# Patient Record
Sex: MALE | Race: BLACK OR AFRICAN AMERICAN | Employment: OTHER | URBAN - METROPOLITAN AREA
[De-identification: names, ages, dates, MRNs, and addresses within clinical notes are randomized per-mention and may not be internally consistent; named-entity substitution may affect disease eponyms.]

---

## 2019-04-10 ENCOUNTER — OFFICE VISIT (OUTPATIENT)
Dept: URGENT CARE | Facility: URGENT CARE | Age: 39
End: 2019-04-10

## 2019-04-10 VITALS
SYSTOLIC BLOOD PRESSURE: 150 MMHG | OXYGEN SATURATION: 98 % | RESPIRATION RATE: 18 BRPM | WEIGHT: 210 LBS | HEIGHT: 70 IN | HEART RATE: 63 BPM | TEMPERATURE: 98.5 F | BODY MASS INDEX: 30.06 KG/M2 | DIASTOLIC BLOOD PRESSURE: 84 MMHG

## 2019-04-10 DIAGNOSIS — M54.50 ACUTE BILATERAL LOW BACK PAIN WITHOUT SCIATICA: Primary | ICD-10-CM

## 2019-04-10 PROCEDURE — 96372 THER/PROPH/DIAG INJ SC/IM: CPT | Performed by: NURSE PRACTITIONER

## 2019-04-10 PROCEDURE — 99203 OFFICE O/P NEW LOW 30 MIN: CPT | Mod: 25 | Performed by: NURSE PRACTITIONER

## 2019-04-10 RX ORDER — KETOROLAC TROMETHAMINE 30 MG/ML
30 INJECTION, SOLUTION INTRAMUSCULAR; INTRAVENOUS ONCE
Status: COMPLETED | OUTPATIENT
Start: 2019-04-10 | End: 2019-04-10

## 2019-04-10 RX ORDER — CYCLOBENZAPRINE HCL 10 MG
5-10 TABLET ORAL 3 TIMES DAILY PRN
Qty: 30 TABLET | Refills: 0 | Status: SHIPPED | OUTPATIENT
Start: 2019-04-10 | End: 2019-05-24

## 2019-04-10 RX ORDER — KETOROLAC TROMETHAMINE 10 MG/1
10 TABLET, FILM COATED ORAL EVERY 6 HOURS PRN
Qty: 20 TABLET | Refills: 0 | Status: SHIPPED | OUTPATIENT
Start: 2019-04-10 | End: 2019-05-24

## 2019-04-10 RX ORDER — KETOROLAC TROMETHAMINE 30 MG/ML
60 INJECTION, SOLUTION INTRAMUSCULAR; INTRAVENOUS ONCE
Status: DISCONTINUED | OUTPATIENT
Start: 2019-04-10 | End: 2019-04-10 | Stop reason: CLARIF

## 2019-04-10 RX ADMIN — KETOROLAC TROMETHAMINE 30 MG: 30 INJECTION, SOLUTION INTRAMUSCULAR; INTRAVENOUS at 14:27

## 2019-04-10 ASSESSMENT — ENCOUNTER SYMPTOMS
SORE THROAT: 0
DIAPHORESIS: 0
NAUSEA: 0
SHORTNESS OF BREATH: 0
RHINORRHEA: 0
FEVER: 0
BACK PAIN: 1
VOMITING: 0
CHILLS: 0
DIARRHEA: 0
COUGH: 0

## 2019-04-10 ASSESSMENT — PAIN SCALES - GENERAL: PAINLEVEL: EXTREME PAIN (9)

## 2019-04-10 ASSESSMENT — MIFFLIN-ST. JEOR: SCORE: 1881.93

## 2019-04-10 NOTE — NURSING NOTE
The following medication was given:     MEDICATION: TORADOL   ROUTE: IM  SITE: Deltoid - Right  DOSE: 30 mg per 1 ML  LOT #: [id number]  :  [email or web address]  EXPIRATION DATE:  10/20  NDC#: 47948-366-85    Armin Delgado

## 2019-04-10 NOTE — PATIENT INSTRUCTIONS
Patient Education     Back Care Tips    Caring for your back  These are things you can do to prevent a recurrence of acute back pain and to reduce symptoms from chronic back pain:    Maintain a healthy weight. If you are overweight, losing weight will help most types of back pain.    Exercise is an important part of recovery from most types of back pain. The muscles behind and in front of the spine support the back. This means strengthening both the back muscles and the abdominal muscles will provide better support for your spine.     Swimming and brisk walking are good overall exercises to improve your fitness level.    Practice safe lifting methods (below).    Practice good posture when sitting, standing and walking. Avoid prolonged sitting. This puts more stress on the lower back than standing or walking.    Wear quality shoes with sufficient arch support. Foot and ankle alignment can affect back symptoms. Women should avoid wearing high heels.    Therapeutic massage can help relax the back muscles without stretching them.    During the first 24 to 72 hours after an acute injury or flare-up of chronic back pain, apply an ice pack to the painful area for 20 minutes and then remove it for 20 minutes, over a period of 60 to 90 minutes, or several times a day. As a safety precaution, do not use a heating pad at bedtime. Sleeping on a heating pad can lead to skin burns or tissue damage.    You can alternate ice and heat therapies.  Medicines  Talk to your healthcare provider before using medicines, especially if you have other medical problems or are taking other medicines.    You may use acetaminophen or ibuprofen to control pain, unless your healthcare provider prescribed other pain medicine. If you have chronic conditions like diabetes, liver or kidney disease, stomach ulcers, or gastrointestinal bleeding, or are taking blood thinners, talk with your healthcare provider before taking any medicines.    Be careful  if you are given prescription pain medicines, narcotics, or medicine for muscle spasm. They can cause drowsiness, affect your coordination, reflexes, and judgment. Do not drive or operate heavy machinery while taking these types of medicines. Take prescription pain medicine only as prescribed by your healthcare provider.  Lumbar stretch  Here is a simple stretching exercise that will help relax muscle spasm and keep your back more limber. If exercise makes your back pain worse, don t do it.    Lie on your back with your knees bent and both feet on the ground.    Slowly raise your left knee to your chest as you flatten your lower back against the floor. Hold for 5 seconds.    Relax and repeat the exercise with your right knee.    Do 10 of these exercises for each leg.  Safe lifting method    Don t bend over at the waist to lift an object off the floor.  Instead, bend your knees and hips in a squat.     Keep your back and head upright    Hold the object close to your body, directly in front of you.    Straighten your legs to lift the object.     Lower the object to the floor in the reverse fashion.    If you must slide something across the floor, push it.  Posture tips  Sitting  Sit in chairs with straight backs or low-back support. Keep your knees lower than your hips, with your feet flat on the floor.  When driving, sit up straight. Adjust the seat forward so you are not leaning toward the steering wheel.  A small pillow or rolled towel behind your lower back may help if you are driving long distances.   Standing  When standing for long periods, shift most of your weight to one leg at a time. Alternate legs every few minutes.   Sleeping  The best way to sleep is on your side with your knees bent. Put a low pillow under your head to support your neck in a neutral spine position. Avoid thick pillows that bend your neck to one side. Put a pillow between your legs to further relax your lower back. If you sleep on your  back, put pillows under your knees to support your legs in a slightly flexed position. Use a firm mattress. If your mattress sags, replace it, or use a 1/2-inch plywood board under the mattress to add support.  Follow-up care  Follow up with your healthcare provider, or as advised.  If X-rays, a CT scan or an MRI scan were taken, they will be reviewed by a radiologist. You will be notified of any new findings that may affect your care.  Call 911  Call 911 if any of the following occur:    Trouble breathing    Confusion    Very drowsy    Fainting or loss of consciousness    Rapid or very slow heart rate    Loss of  bowel or bladder control  When to seek medical advice  Call your healthcare provider right away if any of the following occur:    Pain becomes worse or spreads to your arms or legs    Weakness or numbness in one or both arms or legs    Numbness in the groin area  Date Last Reviewed: 6/1/2016 2000-2018 [employer]. [address] [place] [address], [place] [address]. All rights reserved. This information is not intended as a substitute for professional medical care. Always follow your healthcare professional's instructions.

## 2019-04-10 NOTE — PROGRESS NOTES
"SUBJECTIVE:   Jesus Carl is a 38 year old male presenting with a chief complaint of   Chief Complaint   Patient presents with     Back Pain     for about 3 months        He is a new patient of [facility].    Back Pain    Onset of symptoms was 3 month(s) ago.  Location: bilateral low back  Radiation: radiates into the right buttocks and radiates into the left buttocks  Context: Not injury related, report heavy lifting befre        Course of symptoms is worsening.    Severity moderate  Current and Associated symptoms: pain and stiffness  Denies: fecal incontinence, urinary incontinence, lower extremity numbness, lower extremity weakness and paresthesia    Aggravating Factors: walking, sitting and changing position  Therapies to improve symptoms include: ibuprofen and Tylenol  Past history: no prior back problems      Review of Systems   Constitutional: Negative for chills, diaphoresis and fever.   HENT: Negative for congestion, ear pain, rhinorrhea and sore throat.    Respiratory: Negative for cough and shortness of breath.    Gastrointestinal: Negative for diarrhea, nausea and vomiting.   Musculoskeletal: Positive for back pain.   All other systems reviewed and are negative.      No past medical history on file.  History reviewed. No pertinent family history.  Current Outpatient Medications   Medication Sig Dispense Refill     cyclobenzaprine (FLEXERIL) 10 MG tablet Take 0.5-1 tablets (5-10 mg) by mouth 3 times daily as needed for muscle spasms 30 tablet 0     ketorolac (TORADOL) 10 MG tablet Take 1 tablet (10 mg) by mouth every 6 hours as needed for moderate pain 20 tablet 0     Social History     Tobacco Use     Smoking status: Never Smoker     Smokeless tobacco: Never Used   Substance Use Topics     Alcohol use: Yes       OBJECTIVE  /84 (BP Location: Left arm, Patient Position: Sitting, Cuff Size: Adult Large)   Pulse 63   Temp 98.5  F (36.9  C) (Oral)   Resp 18   Ht 1.775 m (5' 9.88\")   Wt 95.3 " kg (210 lb)   SpO2 98%   BMI 30.23 kg/m      Physical Exam   Constitutional: No distress.   HENT:   Right Ear: Tympanic membrane normal.   Left Ear: Tympanic membrane normal.   Eyes: Pupils are equal, round, and reactive to light. EOM are normal.   Neck: Normal range of motion. Neck supple.   Cardiovascular: Normal rate and normal heart sounds.   Pulmonary/Chest: Effort normal and breath sounds normal. No respiratory distress.   Abdominal: Soft. Bowel sounds are normal.   Musculoskeletal:   Lumbosacral spine area reveals no focal tenderness or mass.  Normal lumbosacral range of motion. Straight leg raise is negative.  Neuro: DTR's, motor strength and sensation normal.   Lymphadenopathy:     He has no cervical adenopathy.   Neurological: He is alert. No cranial nerve deficit.   Skin: Skin is warm and dry. He is not diaphoretic.   Psychiatric: He has a normal mood and affect.   Nursing note and vitals reviewed.        ASSESSMENT:      ICD-10-CM    1. Acute bilateral low back pain without sciatica M54.5 ketorolac (TORADOL) 10 MG tablet     cyclobenzaprine (FLEXERIL) 10 MG tablet     ketorolac (TORADOL) injection 30 mg     DISCONTINUED: ketorolac (TORADOL) injection 60 mg      Differential Diagnosis:  Back Pain: myofascial low back strain, lumbosacral strain, possible herniated disc  and compression fracture    Serious Comorbid Conditions:  Adult:  None    PLAN:  Rest the affected painful area as much as possible.  Apply ice for 15-20 minutes intermittently as needed and especially after any offending activity. Daily stretching.  As pain recedes, begin normal activities slowly as tolerated.  Consider Physical Therapy if symptoms not better with symptomatic care.        Patient Instructions     Patient Education     Back Care Tips    Caring for your back  These are things you can do to prevent a recurrence of acute back pain and to reduce symptoms from chronic back pain:    Maintain a healthy weight. If you are  overweight, losing weight will help most types of back pain.    Exercise is an important part of recovery from most types of back pain. The muscles behind and in front of the spine support the back. This means strengthening both the back muscles and the abdominal muscles will provide better support for your spine.     Swimming and brisk walking are good overall exercises to improve your fitness level.    Practice safe lifting methods (below).    Practice good posture when sitting, standing and walking. Avoid prolonged sitting. This puts more stress on the lower back than standing or walking.    Wear quality shoes with sufficient arch support. Foot and ankle alignment can affect back symptoms. Women should avoid wearing high heels.    Therapeutic massage can help relax the back muscles without stretching them.    During the first 24 to 72 hours after an acute injury or flare-up of chronic back pain, apply an ice pack to the painful area for 20 minutes and then remove it for 20 minutes, over a period of 60 to 90 minutes, or several times a day. As a safety precaution, do not use a heating pad at bedtime. Sleeping on a heating pad can lead to skin burns or tissue damage.    You can alternate ice and heat therapies.  Medicines  Talk to your healthcare provider before using medicines, especially if you have other medical problems or are taking other medicines.    You may use acetaminophen or ibuprofen to control pain, unless your healthcare provider prescribed other pain medicine. If you have chronic conditions like diabetes, liver or kidney disease, stomach ulcers, or gastrointestinal bleeding, or are taking blood thinners, talk with your healthcare provider before taking any medicines.    Be careful if you are given prescription pain medicines, narcotics, or medicine for muscle spasm. They can cause drowsiness, affect your coordination, reflexes, and judgment. Do not drive or operate heavy machinery while taking these  types of medicines. Take prescription pain medicine only as prescribed by your healthcare provider.  Lumbar stretch  Here is a simple stretching exercise that will help relax muscle spasm and keep your back more limber. If exercise makes your back pain worse, don t do it.    Lie on your back with your knees bent and both feet on the ground.    Slowly raise your left knee to your chest as you flatten your lower back against the floor. Hold for 5 seconds.    Relax and repeat the exercise with your right knee.    Do 10 of these exercises for each leg.  Safe lifting method    Don t bend over at the waist to lift an object off the floor.  Instead, bend your knees and hips in a squat.     Keep your back and head upright    Hold the object close to your body, directly in front of you.    Straighten your legs to lift the object.     Lower the object to the floor in the reverse fashion.    If you must slide something across the floor, push it.  Posture tips  Sitting  Sit in chairs with straight backs or low-back support. Keep your knees lower than your hips, with your feet flat on the floor.  When driving, sit up straight. Adjust the seat forward so you are not leaning toward the steering wheel.  A small pillow or rolled towel behind your lower back may help if you are driving long distances.   Standing  When standing for long periods, shift most of your weight to one leg at a time. Alternate legs every few minutes.   Sleeping  The best way to sleep is on your side with your knees bent. Put a low pillow under your head to support your neck in a neutral spine position. Avoid thick pillows that bend your neck to one side. Put a pillow between your legs to further relax your lower back. If you sleep on your back, put pillows under your knees to support your legs in a slightly flexed position. Use a firm mattress. If your mattress sags, replace it, or use a 1/2-inch plywood board under the mattress to add support.  Follow-up  care  Follow up with your healthcare provider, or as advised.  If X-rays, a CT scan or an MRI scan were taken, they will be reviewed by a radiologist. You will be notified of any new findings that may affect your care.  Call 911  Call 911 if any of the following occur:    Trouble breathing    Confusion    Very drowsy    Fainting or loss of consciousness    Rapid or very slow heart rate    Loss of  bowel or bladder control  When to seek medical advice  Call your healthcare provider right away if any of the following occur:    Pain becomes worse or spreads to your arms or legs    Weakness or numbness in one or both arms or legs    Numbness in the groin area  Date Last Reviewed: 6/1/2016 2000-2018 [employer]. [address] [place] [address], [place] [address]. All rights reserved. This information is not intended as a substitute for professional medical care. Always follow your healthcare professional's instructions.

## 2019-05-17 ENCOUNTER — OFFICE VISIT (OUTPATIENT)
Dept: URGENT CARE | Facility: URGENT CARE | Age: 39
End: 2019-05-17
Payer: MEDICAID

## 2019-05-17 ENCOUNTER — ANCILLARY PROCEDURE (OUTPATIENT)
Dept: GENERAL RADIOLOGY | Facility: CLINIC | Age: 39
End: 2019-05-17
Attending: PHYSICIAN ASSISTANT
Payer: MEDICAID

## 2019-05-17 VITALS
RESPIRATION RATE: 22 BRPM | DIASTOLIC BLOOD PRESSURE: 87 MMHG | HEART RATE: 78 BPM | BODY MASS INDEX: 30.55 KG/M2 | TEMPERATURE: 98.2 F | SYSTOLIC BLOOD PRESSURE: 132 MMHG | WEIGHT: 212.2 LBS | OXYGEN SATURATION: 99 %

## 2019-05-17 DIAGNOSIS — M54.50 BILATERAL LOW BACK PAIN WITHOUT SCIATICA, UNSPECIFIED CHRONICITY: Primary | ICD-10-CM

## 2019-05-17 DIAGNOSIS — M54.50 BILATERAL LOW BACK PAIN WITHOUT SCIATICA, UNSPECIFIED CHRONICITY: ICD-10-CM

## 2019-05-17 PROCEDURE — 99214 OFFICE O/P EST MOD 30 MIN: CPT | Performed by: PHYSICIAN ASSISTANT

## 2019-05-17 PROCEDURE — 72100 X-RAY EXAM L-S SPINE 2/3 VWS: CPT

## 2019-05-17 RX ORDER — METHYLPREDNISOLONE 4 MG
TABLET, DOSE PACK ORAL
Qty: 21 TABLET | Refills: 0 | Status: SHIPPED | OUTPATIENT
Start: 2019-05-17 | End: 2019-05-24

## 2019-05-17 NOTE — PROGRESS NOTES
"  S: 38-year-old male presents for evaluation of bilateral low back pain now present for about 6 months.  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  He denies any lower extremity radicular pain, numbness, tingling, weakness.  No fever.  No weight loss.  No night sweats.  No chronic cough.  No bowel or bladder trouble.  Was seen in April for it and given Toradol and Flexeril.  He feels it has not significantly helped with his pain.  He denies any specific injury.  He denies any dysuria.  No urinary frequency, urgency or hesitancy. He feels like the muscles in his back \"knot\" up.      No Known Allergies    History reviewed. No pertinent past medical history.      Current Outpatient Medications on File Prior to Visit:  [] cyclobenzaprine (FLEXERIL) 10 MG tablet Take 0.5-1 tablets (5-10 mg) by mouth 3 times daily as needed for muscle spasms   [] ketorolac (TORADOL) 10 MG tablet Take 1 tablet (10 mg) by mouth every 6 hours as needed for moderate pain     No current facility-administered medications on file prior to visit.     History reviewed. No pertinent surgical history.    Social History     Socioeconomic History     Marital status: Single     Spouse name: Not on file     Number of children: Not on file     Years of education: Not on file     Highest education level: Not on file   Occupational History     Not on file   Social Needs     Financial resource strain: Not on file     Food insecurity:     Worry: Not on file     Inability: Not on file     Transportation needs:     Medical: Not on file     Non-medical: Not on file   Tobacco Use     Smoking status: Never Smoker     Smokeless tobacco: Never Used   Substance and Sexual Activity     Alcohol use: Yes     Drug use: Never     Sexual activity: Not on file   Lifestyle     Physical activity:     Days per week: Not on file     Minutes per session: Not on file     Stress: Not on file   Relationships     Social connections:     " Talks on phone: Not on file     Gets together: Not on file     Attends Denominational service: Not on file     Active member of club or organization: Not on file     Attends meetings of clubs or organizations: Not on file     Relationship status: Not on file     Intimate partner violence:     Fear of current or ex partner: Not on file     Emotionally abused: Not on file     Physically abused: Not on file     Forced sexual activity: Not on file   Other Topics Concern     Not on file   Social History Narrative     Not on file       REVIEW OF SYSTEMS  General: negative for fever  Resp: negative for chest pain   CV: negative for chest pain  : negative for dysuria , incontinence, frequency  Musculoskeletal: as above  Neurologic: negative for ataxia, saddle anesthesia, fecal incontinence, one sided weakness,  Paresthesias  Skin - no rash.  Psych- nl mentation  GI- neg  ENT- neg    Physical Exam:  Vitals: /87   Pulse 78   Temp 98.2  F (36.8  C) (Oral)   Resp 22   Wt 96.3 kg (212 lb 3.2 oz)   SpO2 99%   BMI 30.55 kg/m    BMI= Body mass index is 30.55 kg/m .  Constitutional: healthy, alert and no acute distress   CARDIO: RRR, no MRG  RESP: lungs CTA, NAD  NEURO: Patellar and reflexes intact and equal b/l  BACK:  Straight leg raise intact, left sacroiliac joint tenderness and bilateral lumbar paraspinous muscle TTP, strength intact and equal b/l lower extremities  GAIT: intact  Psychiatric: mentation appears normal and affect normal/bright  Mildly decreased flexion and extension with increased pain with extension    Lumbar xrays- I see no acute findings. No obvious arthritic findings.    Impression:     ICD-10-CM    1. Bilateral low back pain without sciatica, unspecified chronicity M54.5 XR Lumbar Spine 2/3 Views     PHYSICAL THERAPY REFERRAL       Plan:Physical therapy. MDP.btain PCP and f/u 6 weeks. Consider PSA.  Instructions for back care and return precautions discussed.        Tessa Christian PA-C

## 2019-05-22 ENCOUNTER — TELEPHONE (OUTPATIENT)
Dept: URGENT CARE | Facility: URGENT CARE | Age: 39
End: 2019-05-22

## 2019-05-22 NOTE — TELEPHONE ENCOUNTER
Reason for Call:  Other Note    Detailed comments: Pt calling for he would like two copies for Pt to be excused for a month from his work one for his  and the other for his employer due to his back pain .  He would like to come to clinic to  copies at .    Phone Number Patient can be reached at: Home number on file [phone] (home)    Best Time: anytime    Can we leave a detailed message on this number? YES    Call taken on 5/22/2019 at 3:45 PM by Virgil Thomas

## 2019-05-22 NOTE — TELEPHONE ENCOUNTER
Patient was seen in urgent care on 5/17/19 for bilateral back pain. He is asking for note. Please see message below.    Please review and advise.    Stephanie Kwok RN

## 2019-05-23 NOTE — TELEPHONE ENCOUNTER
I called and talked to patient regarding the notes he requested for work. Patient is aware that he needs to see a provider to get the notes. He stated that he already scheduled an appointment to see provider tomorrow at 9:40 am.    Armin Delgado

## 2019-05-23 NOTE — TELEPHONE ENCOUNTER
We do not write work notes in Urgent Care or in Same Day appointments for any extended period of time. I will not excuse a person from work for one month whom I saw once. He needs to obtain a Primary for evaluation and see one Provider for any and all work notes. Please inform him of this.  Tessa Christian PA-C

## 2019-05-24 ENCOUNTER — OFFICE VISIT (OUTPATIENT)
Dept: FAMILY MEDICINE | Facility: CLINIC | Age: 39
End: 2019-05-24
Payer: MEDICAID

## 2019-05-24 VITALS
SYSTOLIC BLOOD PRESSURE: 138 MMHG | BODY MASS INDEX: 30.81 KG/M2 | WEIGHT: 214 LBS | RESPIRATION RATE: 16 BRPM | OXYGEN SATURATION: 100 % | HEART RATE: 71 BPM | DIASTOLIC BLOOD PRESSURE: 89 MMHG | TEMPERATURE: 98.4 F

## 2019-05-24 DIAGNOSIS — M54.50 ACUTE BILATERAL LOW BACK PAIN WITHOUT SCIATICA: Primary | ICD-10-CM

## 2019-05-24 PROCEDURE — 99213 OFFICE O/P EST LOW 20 MIN: CPT | Performed by: FAMILY MEDICINE

## 2019-05-24 RX ORDER — CYCLOBENZAPRINE HCL 10 MG
5-10 TABLET ORAL 3 TIMES DAILY PRN
Qty: 30 TABLET | Refills: 0 | Status: SHIPPED | OUTPATIENT
Start: 2019-05-24

## 2019-05-24 RX ORDER — DICLOFENAC SODIUM 75 MG/1
75 TABLET, DELAYED RELEASE ORAL 2 TIMES DAILY PRN
Qty: 60 TABLET | Refills: 3 | Status: SHIPPED | OUTPATIENT
Start: 2019-05-24

## 2019-05-24 ASSESSMENT — PAIN SCALES - GENERAL: PAINLEVEL: SEVERE PAIN (6)

## 2019-05-24 NOTE — PROGRESS NOTES
Subjective     Jesus Carl is a 38 year old male who presents to clinic today for the following health issues:    HPI   Back Pain/ Urgent Care Follow up  5/19/2019  ANANTH ZAPIEN      Duration:         Specific cause: lifting, turning/bending    Description:   Location of pain: low back both  Character of pain: sharp and intermittent  Pain radiation:none  New numbness or weakness in legs, not attributed to pain:  no     Intensity: Currently 6/10    History:   Pain interferes with job: YES  History of back problems: no prior back problems  Any previous MRI or X-rays: Yes- at [facility].  Date 5/17/2019  Sees a specialist for back pain:  No  Therapies tried without relief: muscle relaxants and Physical Therapy    Alleviating factors:   Improved by: muscle relaxants      Precipitating factors:  Worsened by: Lifting, Bending, Standing, Sitting, Lying Flat and Walking        Accompanying Signs & Symptoms:  Risk of Fracture:  None  Risk of Cauda Equina:  None  Risk of Infection:  None  Risk of Cancer:  None  Risk of Ankylosing Spondylitis:  Onset at age <35, male, AND morning back stiffness. no     Reviewed and updated as needed this visit by Provider         Review of Systems   ROS COMP: Constitutional, HEENT, cardiovascular, pulmonary, GI, , musculoskeletal, neuro, skin, endocrine and psych systems are negative, except as otherwise noted.      Objective    /89 (BP Location: Left arm, Patient Position: Chair, Cuff Size: Adult Large)   Pulse 71   Temp 98.4  F (36.9  C) (Oral)   Resp 16   Wt 97.1 kg (214 lb)   SpO2 100%   BMI 30.81 kg/m    Body mass index is 30.81 kg/m .  Physical Exam   GENERAL: healthy, alert and no distress  NECK: no adenopathy, no asymmetry, masses, or scars and thyroid normal to palpation  RESP: lungs clear to auscultation - no rales, rhonchi or wheezes  CV: regular rate and rhythm, normal S1 S2, no S3 or S4, no murmur, click or rub, no peripheral edema and peripheral pulses  "strong  ABDOMEN: soft, nontender, no hepatosplenomegaly, no masses and bowel sounds normal  MS: mild lumbar tenderness  Diagnostic Test Results:  Labs reviewed in Epic        Assessment & Plan     1. Acute bilateral low back pain without sciatica  Strain. Patient stated he will start physical therapy next week. He would like one month off for treatment. Okay with this. May take nsaid and muscle relaxer as needed. Referred to Sport Medicine for evaluation and recommendations.  - ORTHO  REFERRAL  - cyclobenzaprine (FLEXERIL) 10 MG tablet; Take 0.5-1 tablets (5-10 mg) by mouth 3 times daily as needed for muscle spasms  Dispense: 30 tablet; Refill: 0  - diclofenac (VOLTAREN) 75 MG EC tablet; Take 1 tablet (75 mg) by mouth 2 times daily as needed  Dispense: 60 tablet; Refill: 3     BMI:   Estimated body mass index is 30.81 kg/m  as calculated from the following:    Height as of 4/10/19: 1.775 m (5' 9.88\").    Weight as of this encounter: 97.1 kg (214 lb).           See Patient Instructions    Return in about 1 month (around 6/21/2019) for as needed.    Gael Adrian MD, MD  [facility]    "

## 2019-05-24 NOTE — LETTER
[address] [address]  Phone: [phone]    May 24, 2019        Jesus Carl  [address] [address]          To whom it may concern:    RE: Jesus Carl    Patient was seen and treated today at our clinic and missed work.  Patient may return to work 6/24/2019 with the following:  Light duty-unable to lift more than 20 pounds  When the patient returns to work, the following restrictions apply until 7/24/2019:    Lift, carry, push, and pull no more than: 11-20 lbs.     Please contact me for questions or concerns.      Sincerely,        Gael Adrian MD

## 2019-06-21 ENCOUNTER — THERAPY VISIT (OUTPATIENT)
Dept: PHYSICAL THERAPY | Facility: CLINIC | Age: 39
End: 2019-06-21
Payer: MEDICAID

## 2019-06-21 DIAGNOSIS — M54.50 BILATERAL LOW BACK PAIN WITHOUT SCIATICA: ICD-10-CM

## 2019-06-21 PROCEDURE — 97110 THERAPEUTIC EXERCISES: CPT | Mod: GP | Performed by: PHYSICAL THERAPIST

## 2019-06-21 PROCEDURE — 97161 PT EVAL LOW COMPLEX 20 MIN: CPT | Mod: GP | Performed by: PHYSICAL THERAPIST

## 2019-06-21 PROCEDURE — 97112 NEUROMUSCULAR REEDUCATION: CPT | Mod: GP | Performed by: PHYSICAL THERAPIST

## 2019-06-21 NOTE — PROGRESS NOTES
[facility] Initial Evaluation  Subjective:  The history is provided by the patient. No  was used.   Type of problem:  Lumbar   Condition occurred with:  Lifting. This is a chronic condition   Problem details: November 2018. .   Patient reports pain:  Lumbar spine right, lumbar spine left and central lumbar spine. Radiates to:  Gluteals right, thigh right and thigh left. Associated symptoms:  Loss of motion/stiffness. Symptoms are exacerbated by bending, twisting, lifting, sitting and standing Relieved by: PAIN MED.    Jesusblake Carl being seen for LBP.   Date of Onset: NOVEMBER 2018. Where condition occurred: at home.Problem occurred: LIFTING.   and reported as 10/10 on pain scale. General health as reported by patient is fair. Pertinent medical history includes:  Migraines/headaches.  Medical allergies: NONE.  Surgeries include:  None.  Current medications:  Muscle relaxants.   Primary job tasks include:  Lifting/carrying, driving, prolonged standing and prolonged sitting.  Pain is described as burning, sharp and shooting and is intermittent. Pain is worse during the night. Since onset symptoms are gradually worsening. Special tests:  X-ray. Previous treatment includes chiropractic. There was moderate improvement following previous treatment.   Patient is . Restrictions include:  Currently not working due to present treatment.    Home/work barriers: NOT WORKING RIGHT NOW.   Red flags:  Non-healing wounds.                      Objective:  System    Physical Exam      Eliel Lumbar Evaluation    Posture:  Sitting: poor        Correction of Posture: better  Other Observations: BILAT. LBP WITH SITTING.  Movement Loss:  Flexion (Flex): mod    Side Glide R (SG R): pain and nil  Side Glide L (SG L): pain and nil  Test Movements:  FIS: During: increases      EIS: During: centralizing                                                         ROS    Assessment/Plan:     {REHAB NOTES:370675}

## 2019-06-21 NOTE — LETTER
DEPARTMENT OF HEALTH AND HUMAN SERVICES  CENTERS FOR MEDICARE & MEDICAID SERVICES    PLAN/UPDATED PLAN OF PROGRESS FOR OUTPATIENT REHABILITATION    PATIENTS NAME:  Jesus Carl   : 1980  PROVID[facility] NUMBER:    [id number]  [id number]   PROVIDER NAME: [facility]  MEDICAL RECORD NUMBER: [id number]   START OF CARE DATE:  SOC Date: 19   TYPE:  PT  PRIMARY/TREATMENT DIAGNOSIS: (Pertinent Medical Diagnosis)  Bilateral low back pain without sciatica  VISITS FROM START OF CARE:  Rxs Used: 1     [facility] Initial Evaluation  Subjective:  The history is provided by the patient. No  was used.   Type of problem:  Lumbar   Condition occurred with:  Lifting. This is a chronic condition   Problem details: 2018. .   Patient reports pain:  Lumbar spine right, lumbar spine left and central lumbar spine. Radiates to:  Gluteals right, thigh right and thigh left. Associated symptoms:  Loss of motion/stiffness. Symptoms are exacerbated by bending, twisting, lifting, sitting and standing Relieved by: PAIN MED.    Jesus Carl being seen for LBP.   Date of Onset: 2018. Where condition occurred: at home.Problem occurred: LIFTING.   and reported as 10/10 on pain scale. General health as reported by patient is fair. Pertinent medical history includes:  Migraines/headaches.  Medical allergies: NONE.  Surgeries include:  None.  Current medications:  Muscle relaxants.   Primary job tasks include:  Lifting/carrying, driving, prolonged standing and prolonged sitting.  Pain is described as burning, sharp and shooting and is intermittent. Pain is worse during the night. Since onset symptoms are gradually worsening. Special tests:  X-ray. Previous treatment includes chiropractic. There was moderate improvement following previous treatment.   Patient is . Restrictions include:  Currently not working due to present  treatment.  Home/work barriers: NOT WORKING RIGHT NOW.   Red flags:  Non-healing wounds.  Objective:  System  Physical Exam  Eliel Lumbar Evaluation  Posture:  Sitting: poor  Correction of Posture: better  Other Observations: BILAT. LBP WITH SITTING.  Movement Loss:  Flexion (Flex): mod  Side Glide R (SG R): pain and nil  Side Glide L (SG L): pain and nil  Test Movements:  FIS: During: increases    EIS: During: centralizing    Conclusion: derangement  Principle of Treatment:  Posture Correction: IN SITTING WITH TOWEL ROLL.   Extension: EIS  Other: NEUTRAL SPINE, THER EX, THER ACT, NMR, MANUAL THERAPY.     Assessment/Plan:    Patient is a 38 year old male with lumbar complaints.    Patient has the following significant findings with corresponding treatment plan.                Diagnosis 1:  BILATERAL LOW BACK PAIN WITHOUT SCIATICA  Pain -  hot/cold therapy, US, electric stimulation, manual therapy, splint/taping/bracing/orthotics, self management, education, directional preference exercise and home program  Decreased ROM/flexibility - manual therapy, therapeutic exercise, therapeutic activity and home program  Decreased function - therapeutic activities and home program  Impaired posture - neuro re-education, therapeutic activities and home program    Therapy Evaluation Codes:   1) History comprised of:   Personal factors that impact the plan of care:      Past/current experiences.    Comorbidity factors that impact the plan of care are:      Dizziness, Migraines/headaches, Pain at night/rest and Foot drop. .     Medications impacting care: Muscle relaxant.  2) Examination of Body Systems comprised of:   Body structures and functions that impact the plan of care:      Lumbar spine.   Activity limitations that impact the plan of care are:      Bending, Driving, Lifting, Sitting, Working and Transfers. .  3) Clinical presentation characteristics are:   Stable/Uncomplicated.  4) Decision-Making    Low complexity  "using standardized patient assessment instrument and/or measureable assessment of functional outcome.  Cumulative Therapy Evaluation is: Low complexity.  Previous and current functional limitations:  (See Goal Flow Sheet for this information)    Short term and Long term goals: (See Goal Flow Sheet for this information)   Communication ability:  Patient appears to be able to clearly communicate and understand verbal and written communication and follow directions correctly.  Treatment Explanation - The following has been discussed with the patient:   RX ordered/plan of care  Anticipated outcomes  Possible risks and side effects  This patient would benefit from PT intervention to resume normal activities.   Rehab potential is good.  Frequency:  1 X week, once daily  Duration:  for 6 weeks  Discharge Plan:  Achieve all LTG.  Independent in home treatment program.  Reach maximal therapeutic benefit.    Please refer to the daily flowsheet for treatment today, total treatment time and time spent performing 1:1 timed codes.   Caregiver Signature/Credentials _____________________________________________ Date ________       Devon Gabriel PT/ATC   I have reviewed and certified the need for these services and plan of treatment while under my care.        PHYSICIAN'S SIGNATURE:   ___________________________________________  Date___________     Tessa Christian MD    Certification period:  Beginning of Cert date period: 06/21/19 to  End of Cert period date: 09/18/19   Functional Level Progress Report: Please see attached \"Goal Flow sheet for Functional level.\"  ____X____ Continue Services or       ________ DC Services              Service dates: From  SOC Date: 06/21/19 date to present                         "

## 2019-06-24 PROBLEM — M54.50 BILATERAL LOW BACK PAIN WITHOUT SCIATICA: Status: ACTIVE | Noted: 2019-06-24

## 2019-06-24 NOTE — PROGRESS NOTES
[facility] Initial Evaluation  Subjective:  The history is provided by the patient. No  was used.   Type of problem:  Lumbar   Condition occurred with:  Lifting. This is a chronic condition   Problem details: November 2018. .   Patient reports pain:  Lumbar spine right, lumbar spine left and central lumbar spine. Radiates to:  Gluteals right, thigh right and thigh left. Associated symptoms:  Loss of motion/stiffness. Symptoms are exacerbated by bending, twisting, lifting, sitting and standing Relieved by: PAIN MED.    Jesusblake Carl being seen for LBP.   Date of Onset: NOVEMBER 2018. Where condition occurred: at home.Problem occurred: LIFTING.   and reported as 10/10 on pain scale. General health as reported by patient is fair. Pertinent medical history includes:  Migraines/headaches.  Medical allergies: NONE.  Surgeries include:  None.  Current medications:  Muscle relaxants.   Primary job tasks include:  Lifting/carrying, driving, prolonged standing and prolonged sitting.  Pain is described as burning, sharp and shooting and is intermittent. Pain is worse during the night. Since onset symptoms are gradually worsening. Special tests:  X-ray. Previous treatment includes chiropractic. There was moderate improvement following previous treatment.   Patient is . Restrictions include:  Currently not working due to present treatment.    Home/work barriers: NOT WORKING RIGHT NOW.   Red flags:  Non-healing wounds.                      Objective:  System    Physical Exam      Eliel Lumbar Evaluation    Posture:  Sitting: poor        Correction of Posture: better  Other Observations: BILAT. LBP WITH SITTING.  Movement Loss:  Flexion (Flex): mod    Side Glide R (SG R): pain and nil  Side Glide L (SG L): pain and nil  Test Movements:  FIS: During: increases      EIS: During: centralizing                Conclusion: derangement  Principle of Treatment:  Posture Correction:  IN SITTING WITH TOWEL ROLL.     Extension: EIS    Other: NEUTRAL SPINE, THER EX, THER ACT, NMR, MANUAL THERAPY.                                        ROS    Assessment/Plan:    Patient is a 38 year old male with lumbar complaints.    Patient has the following significant findings with corresponding treatment plan.                Diagnosis 1:  BILATERAL LOW BACK PAIN WITHOUT SCIATICA  Pain -  hot/cold therapy, US, electric stimulation, manual therapy, splint/taping/bracing/orthotics, self management, education, directional preference exercise and home program  Decreased ROM/flexibility - manual therapy, therapeutic exercise, therapeutic activity and home program  Decreased function - therapeutic activities and home program  Impaired posture - neuro re-education, therapeutic activities and home program    Therapy Evaluation Codes:   1) History comprised of:   Personal factors that impact the plan of care:      Past/current experiences.    Comorbidity factors that impact the plan of care are:      Dizziness, Migraines/headaches, Pain at night/rest and Foot drop. .     Medications impacting care: Muscle relaxant.  2) Examination of Body Systems comprised of:   Body structures and functions that impact the plan of care:      Lumbar spine.   Activity limitations that impact the plan of care are:      Bending, Driving, Lifting, Sitting, Working and Transfers. .  3) Clinical presentation characteristics are:   Stable/Uncomplicated.  4) Decision-Making    Low complexity using standardized patient assessment instrument and/or measureable assessment of functional outcome.  Cumulative Therapy Evaluation is: Low complexity.    Previous and current functional limitations:  (See Goal Flow Sheet for this information)    Short term and Long term goals: (See Goal Flow Sheet for this information)     Communication ability:  Patient appears to be able to clearly communicate and understand verbal and written communication and follow  directions correctly.  Treatment Explanation - The following has been discussed with the patient:   RX ordered/plan of care  Anticipated outcomes  Possible risks and side effects  This patient would benefit from PT intervention to resume normal activities.   Rehab potential is good.    Frequency:  1 X week, once daily  Duration:  for 6 weeks  Discharge Plan:  Achieve all LTG.  Independent in home treatment program.  Reach maximal therapeutic benefit.    Please refer to the daily flowsheet for treatment today, total treatment time and time spent performing 1:1 timed codes.

## 2019-06-28 ENCOUNTER — THERAPY VISIT (OUTPATIENT)
Dept: PHYSICAL THERAPY | Facility: CLINIC | Age: 39
End: 2019-06-28
Payer: MEDICAID

## 2019-06-28 DIAGNOSIS — M54.50 BILATERAL LOW BACK PAIN WITHOUT SCIATICA: ICD-10-CM

## 2019-06-28 PROCEDURE — 97035 APP MDLTY 1+ULTRASOUND EA 15: CPT | Mod: GP | Performed by: PHYSICAL THERAPIST

## 2019-06-28 PROCEDURE — 97140 MANUAL THERAPY 1/> REGIONS: CPT | Mod: GP | Performed by: PHYSICAL THERAPIST

## 2019-06-28 PROCEDURE — 97110 THERAPEUTIC EXERCISES: CPT | Mod: GP | Performed by: PHYSICAL THERAPIST

## 2019-06-28 PROCEDURE — 97112 NEUROMUSCULAR REEDUCATION: CPT | Mod: GP | Performed by: PHYSICAL THERAPIST

## 2019-12-04 PROBLEM — M54.50 BILATERAL LOW BACK PAIN WITHOUT SCIATICA: Status: RESOLVED | Noted: 2019-06-24 | Resolved: 2019-12-04

## 2019-12-05 NOTE — PROGRESS NOTES
Patient did not complete the planned course of treatment so current status is unknown.  Please see 6/28/2019 flowsheet for discharge status.  Discharge from physical therapy at this time.